# Patient Record
Sex: FEMALE | Race: WHITE | URBAN - METROPOLITAN AREA
[De-identification: names, ages, dates, MRNs, and addresses within clinical notes are randomized per-mention and may not be internally consistent; named-entity substitution may affect disease eponyms.]

---

## 2017-01-04 ENCOUNTER — APPOINTMENT (RX ONLY)
Dept: URBAN - METROPOLITAN AREA CLINIC 304 | Facility: CLINIC | Age: 82
Setting detail: DERMATOLOGY
End: 2017-01-04

## 2017-01-04 DIAGNOSIS — L90.5 SCAR CONDITIONS AND FIBROSIS OF SKIN: ICD-10-CM

## 2017-01-04 DIAGNOSIS — D485 NEOPLASM OF UNCERTAIN BEHAVIOR OF SKIN: ICD-10-CM

## 2017-01-04 PROBLEM — D48.5 NEOPLASM OF UNCERTAIN BEHAVIOR OF SKIN: Status: ACTIVE | Noted: 2017-01-04

## 2017-01-04 PROCEDURE — 99213 OFFICE O/P EST LOW 20 MIN: CPT | Mod: 25

## 2017-01-04 PROCEDURE — ? BIOPSY BY SHAVE METHOD

## 2017-01-04 PROCEDURE — 11100: CPT

## 2017-01-04 ASSESSMENT — LOCATION SIMPLE DESCRIPTION DERM
LOCATION SIMPLE: LEFT HAND
LOCATION SIMPLE: RIGHT FOREHEAD

## 2017-01-04 ASSESSMENT — LOCATION ZONE DERM
LOCATION ZONE: FACE
LOCATION ZONE: HAND

## 2017-01-04 ASSESSMENT — LOCATION DETAILED DESCRIPTION DERM
LOCATION DETAILED: LEFT RADIAL DORSAL HAND
LOCATION DETAILED: RIGHT LATERAL FOREHEAD

## 2017-01-04 NOTE — HPI: SKIN LESION
How Severe Is Your Skin Lesion?: mild
Has Your Skin Lesion Been Treated?: been treated
Is This A New Presentation, Or A Follow-Up?: Follow Up Skin Lesion
When Was It Treated?: Beginning of 2016

## 2017-02-20 ENCOUNTER — APPOINTMENT (RX ONLY)
Dept: URBAN - METROPOLITAN AREA CLINIC 304 | Facility: CLINIC | Age: 82
Setting detail: DERMATOLOGY
End: 2017-02-20

## 2017-02-20 PROBLEM — L57.0 ACTINIC KERATOSIS: Status: ACTIVE | Noted: 2017-02-20

## 2017-02-20 PROBLEM — C44.629 SQUAMOUS CELL CARCINOMA OF SKIN OF LEFT UPPER LIMB, INCLUDING SHOULDER: Status: ACTIVE | Noted: 2017-02-20

## 2017-02-20 PROCEDURE — ? CRYOSURGICAL DESTRUCTION

## 2017-02-20 PROCEDURE — 17271 DSTR MAL LES S/N/H/F/G 0.6-1: CPT

## 2017-02-20 NOTE — PROCEDURE: CRYOSURGICAL DESTRUCTION
Number Of Freeze-Thaw Cycles: 2 freeze-thaw cycles
Total Time In Minutes: 0.3 minutes
Pre-Procedure: The surgical site was antiseptically prepared.
Detail Level: Detailed
Additional Information: (Optional): The wound was cleaned, and a dressing was applied.  The patient received detailed post-op instructions. Pt aware of risk of recurrence.\\nRecheck 3 mo
Bill As A Line Item Or As Units: Line Item
Anesthesia Volume In Cc: 0
Size Of Lesion In Cm: 0.7

## 2018-12-17 ENCOUNTER — APPOINTMENT (RX ONLY)
Dept: URBAN - METROPOLITAN AREA CLINIC 304 | Facility: CLINIC | Age: 83
Setting detail: DERMATOLOGY
End: 2018-12-17

## 2018-12-17 DIAGNOSIS — L57.0 ACTINIC KERATOSIS: ICD-10-CM

## 2018-12-17 DIAGNOSIS — D485 NEOPLASM OF UNCERTAIN BEHAVIOR OF SKIN: ICD-10-CM

## 2018-12-17 DIAGNOSIS — L28.0 LICHEN SIMPLEX CHRONICUS: ICD-10-CM

## 2018-12-17 DIAGNOSIS — D22 MELANOCYTIC NEVI: ICD-10-CM

## 2018-12-17 PROBLEM — L85.3 XEROSIS CUTIS: Status: ACTIVE | Noted: 2018-12-17

## 2018-12-17 PROBLEM — L29.8 OTHER PRURITUS: Status: ACTIVE | Noted: 2018-12-17

## 2018-12-17 PROBLEM — D48.5 NEOPLASM OF UNCERTAIN BEHAVIOR OF SKIN: Status: ACTIVE | Noted: 2018-12-17

## 2018-12-17 PROBLEM — D22.4 MELANOCYTIC NEVI OF SCALP AND NECK: Status: ACTIVE | Noted: 2018-12-17

## 2018-12-17 PROCEDURE — 99213 OFFICE O/P EST LOW 20 MIN: CPT | Mod: 25

## 2018-12-17 PROCEDURE — ? LIQUID NITROGEN

## 2018-12-17 PROCEDURE — 11100: CPT | Mod: 59

## 2018-12-17 PROCEDURE — ? COUNSELING

## 2018-12-17 PROCEDURE — ? BIOPSY BY SHAVE METHOD

## 2018-12-17 PROCEDURE — 17000 DESTRUCT PREMALG LESION: CPT

## 2018-12-17 ASSESSMENT — LOCATION SIMPLE DESCRIPTION DERM
LOCATION SIMPLE: POSTERIOR NECK
LOCATION SIMPLE: RIGHT FOREARM
LOCATION SIMPLE: LEFT HAND
LOCATION SIMPLE: RIGHT POSTERIOR THIGH

## 2018-12-17 ASSESSMENT — LOCATION DETAILED DESCRIPTION DERM
LOCATION DETAILED: LEFT ULNAR DORSAL HAND
LOCATION DETAILED: RIGHT PROXIMAL LATERAL POSTERIOR THIGH
LOCATION DETAILED: RIGHT MEDIAL TRAPEZIAL NECK
LOCATION DETAILED: RIGHT DISTAL DORSAL FOREARM

## 2018-12-17 ASSESSMENT — LOCATION ZONE DERM
LOCATION ZONE: LEG
LOCATION ZONE: NECK
LOCATION ZONE: ARM
LOCATION ZONE: HAND

## 2019-06-03 ENCOUNTER — APPOINTMENT (RX ONLY)
Dept: URBAN - METROPOLITAN AREA CLINIC 304 | Facility: CLINIC | Age: 84
Setting detail: DERMATOLOGY
End: 2019-06-03

## 2019-06-03 DIAGNOSIS — L82.1 OTHER SEBORRHEIC KERATOSIS: ICD-10-CM

## 2019-06-03 DIAGNOSIS — D22 MELANOCYTIC NEVI: ICD-10-CM

## 2019-06-03 DIAGNOSIS — L57.0 ACTINIC KERATOSIS: ICD-10-CM

## 2019-06-03 PROBLEM — D22.9 MELANOCYTIC NEVI, UNSPECIFIED: Status: ACTIVE | Noted: 2019-06-03

## 2019-06-03 PROCEDURE — ? PREMALIGNANT DESTRUCTION

## 2019-06-03 PROCEDURE — 17000 DESTRUCT PREMALG LESION: CPT

## 2019-06-03 PROCEDURE — 17003 DESTRUCT PREMALG LES 2-14: CPT

## 2019-06-03 PROCEDURE — 99213 OFFICE O/P EST LOW 20 MIN: CPT | Mod: 25

## 2019-06-03 ASSESSMENT — LOCATION ZONE DERM
LOCATION ZONE: FACE
LOCATION ZONE: ARM
LOCATION ZONE: NECK

## 2019-06-03 ASSESSMENT — LOCATION SIMPLE DESCRIPTION DERM
LOCATION SIMPLE: LEFT EYEBROW
LOCATION SIMPLE: LEFT FOREHEAD
LOCATION SIMPLE: RIGHT FOREHEAD
LOCATION SIMPLE: LEFT ANTERIOR NECK
LOCATION SIMPLE: LEFT FOREARM
LOCATION SIMPLE: RIGHT FOREARM

## 2019-06-03 ASSESSMENT — LOCATION DETAILED DESCRIPTION DERM
LOCATION DETAILED: LEFT PROXIMAL DORSAL FOREARM
LOCATION DETAILED: RIGHT PROXIMAL DORSAL FOREARM
LOCATION DETAILED: RIGHT INFERIOR LATERAL FOREHEAD
LOCATION DETAILED: LEFT LATERAL EYEBROW
LOCATION DETAILED: RIGHT MEDIAL FOREHEAD
LOCATION DETAILED: LEFT INFERIOR LATERAL NECK
LOCATION DETAILED: LEFT INFERIOR LATERAL FOREHEAD

## 2019-06-03 NOTE — PROCEDURE: PREMALIGNANT DESTRUCTION
Post-Care Instructions: I reviewed with the patient in detail post-care instructions. Patient is to wear sunprotection, and avoid picking at any of the treated lesions. Pt may apply Vaseline to crusted or scabbing areas.
Render Note In Bullet Format When Appropriate: No
Detail Level: Detailed
Method: liquid nitrogen
Anesthesia Volume In Cc: 0
Post-Procedure Care (Optional): The wound was cleaned, and a pressure dressing was applied.  The patient received detailed post-op instructions.
Consent: The patient's consent was obtained including but not limited to risks of crusting, scabbing, blistering, scarring, darker or lighter pigmentary change, recurrence, incomplete removal and infection.